# Patient Record
Sex: MALE | URBAN - METROPOLITAN AREA
[De-identification: names, ages, dates, MRNs, and addresses within clinical notes are randomized per-mention and may not be internally consistent; named-entity substitution may affect disease eponyms.]

---

## 2024-06-18 ENCOUNTER — APPOINTMENT (OUTPATIENT)
Dept: URGENT CARE | Facility: CLINIC | Age: 49
End: 2024-06-18

## 2024-07-01 ENCOUNTER — OFFICE VISIT (OUTPATIENT)
Dept: URGENT CARE | Facility: CLINIC | Age: 49
End: 2024-07-01
Payer: COMMERCIAL

## 2024-07-01 VITALS
RESPIRATION RATE: 14 BRPM | SYSTOLIC BLOOD PRESSURE: 142 MMHG | OXYGEN SATURATION: 97 % | DIASTOLIC BLOOD PRESSURE: 94 MMHG | WEIGHT: 199.6 LBS | HEART RATE: 87 BPM | HEIGHT: 70 IN | BODY MASS INDEX: 28.58 KG/M2 | TEMPERATURE: 97.6 F

## 2024-07-01 DIAGNOSIS — G51.0 BELL'S PALSY: Primary | ICD-10-CM

## 2024-07-01 PROCEDURE — 99212 OFFICE O/P EST SF 10 MIN: CPT | Performed by: PHYSICIAN ASSISTANT

## 2024-07-01 RX ORDER — PREDNISONE 10 MG/1
TABLET ORAL DAILY
Qty: 18 TABLET | Refills: 0 | Status: SHIPPED | OUTPATIENT
Start: 2024-07-01 | End: 2024-07-10

## 2024-07-01 RX ORDER — PREDNISONE 50 MG/1
50 TABLET ORAL DAILY
Qty: 7 TABLET | Refills: 0 | Status: SHIPPED | OUTPATIENT
Start: 2024-07-01 | End: 2024-07-08

## 2024-07-01 RX ORDER — VALACYCLOVIR HYDROCHLORIDE 1 G/1
1000 TABLET, FILM COATED ORAL 3 TIMES DAILY
Qty: 21 TABLET | Refills: 0 | Status: SHIPPED | OUTPATIENT
Start: 2024-07-01 | End: 2024-07-08

## 2024-07-01 NOTE — PROGRESS NOTES
[facility] URGENT CARE JANETH NOTE:      Name: Juan Monroy, 48 y.o.    CSN:[id number]   MRN:[id number]    PCP: No primary care provider on file.    ALL:    Allergies   Allergen Reactions    Penicillins Other       History:    Chief Complaint: Facial Droop (RIGHT SIDE FACIAL DROOPING X 3 DAYS)    Encounter Date: 7/1/2024  1115    HPI: The history was obtained from the patient. Juan is a 48 y.o. male, who presents with a chief complaint of Facial Droop (RIGHT SIDE FACIAL DROOPING X 3 DAYS) Patient reports right sided facial droop and numbness of the tongue on Saturday morning. He says his symptoms have not changed since onset. He is having some difficulty eating and drinking due to numbness in the mouth and weakness in the facial muscles. He was admitted to the ER only for severe dehydration 2 weeks ago. He denies head trauma, use of blood thinners, recent illness.     He denies weakness of the extremities, speech difficulty, gait impairment, impaired balance, vision changes, headache, tinnitus, hearing loss, dizziness and paresthesias of the extremities. He takes only ashwaganda and a multivitamin.     Denies history of CV disease.     PMHx:    History reviewed. No pertinent past medical history.           Current Outpatient Medications   Medication Sig Dispense Refill    predniSONE (Deltasone) 10 mg tablet Take 3 tablets (30 mg) by mouth once daily for 3 days, THEN 2 tablets (20 mg) once daily for 3 days, THEN 1 tablet (10 mg) once daily for 3 days. 18 tablet 0    predniSONE (Deltasone) 50 mg tablet Take 1 tablet (50 mg) by mouth once daily for 7 days. FOR THE FIRST SEVEN DAYS 7 tablet 0    valACYclovir (Valtrex) 1 gram tablet Take 1 tablet (1,000 mg) by mouth 3 times a day for 7 days. 21 tablet 0     No current facility-administered medications for this visit.         PMSx:  History reviewed. No pertinent surgical history.    Fam Hx: No family history on file.    SOC. Hx:     Social  History     Socioeconomic History    Marital status:      Spouse name: Not on file    Number of children: Not on file    Years of education: Not on file    Highest education level: Not on file   Occupational History    Not on file   Tobacco Use    Smoking status: Every Day     Types: Cigarettes    Smokeless tobacco: Never   Substance and Sexual Activity    Alcohol use: Not on file    Drug use: Not on file    Sexual activity: Not on file   Other Topics Concern    Not on file   Social History Narrative    Not on file     Social Determinants of Health     Financial Resource Strain: Not on file   Food Insecurity: Not on file   Transportation Needs: Not on file   Physical Activity: Not on file   Stress: Not on file   Social Connections: Not on file   Intimate Partner Violence: Not on file   Housing Stability: Not on file         Vitals:    07/01/24 1100   BP: (!) 142/94   Pulse: 87   Resp: 14   Temp: 36.4 °C (97.6 °F)   SpO2: 97%     90.5 kg (199 lb 9.6 oz)          Physical Exam  Constitutional:       Appearance: Normal appearance.   HENT:      Head: Normocephalic and atraumatic.      Right Ear: Tympanic membrane, ear canal and external ear normal.      Left Ear: Tympanic membrane, ear canal and external ear normal.      Nose: Nose normal.      Mouth/Throat:      Mouth: Mucous membranes are moist.      Tongue: Tongue does not deviate from midline.      Pharynx: Oropharynx is clear. Uvula midline.      Comments: Symmetric rise of the pharynx and uvula.   Eyes:      General: No visual field deficit.     Extraocular Movements: Extraocular movements intact.      Conjunctiva/sclera: Conjunctivae normal.      Pupils: Pupils are equal, round, and reactive to light.   Cardiovascular:      Rate and Rhythm: Normal rate.   Pulmonary:      Effort: Pulmonary effort is normal.   Musculoskeletal:         General: Normal range of motion.   Skin:     General: Skin is warm and dry.   Neurological:      General: No focal deficit  present.      Mental Status: He is alert and oriented to person, place, and time.      Cranial Nerves: Facial asymmetry (right) present.      Sensory: Sensation is intact.      Motor: Motor function is intact. No weakness or pronator drift.      Coordination: Coordination is intact. Romberg sign negative. Finger-Nose-Finger Test normal. Rapid alternating movements normal.      Gait: Gait is intact.      Comments: Right sided facial drooping affecting the forehead/right eyebrow.    Psychiatric:         Mood and Affect: Mood normal.         Behavior: Behavior normal.         Thought Content: Thought content normal.         Judgment: Judgment normal.             ____________________________________________________________________    I did personally review Juan's past medical history, surgical history, social history, as well as family history (when relevant).  In this case, I also oversaw the his drug management by reviewing his medication list, allergy list, as well as the medications that I prescribed during the UC course and/or recommended as an out-patient (including possible OTC medications such as acetaminophen, NSAIDs , etc).    After reviewing the items above, I did look at previous medical documentation, such as recent hospitalizations, office visits, and/or recent consultations with PCP/specialist.                          SDOH:   Another factor that I considered in Juan's care was his Social Determinants of Health (SDOH). During this UC encounter, he did not have social determinants of health. Those SDOH influencing Juan's care are: none      UC COURSE/MEDICAL DECISION MAKING:    Juan is a 48 y.o., who presents with a working diagnosis of   1. Bell's palsy     with a differential to include: TIA, CVA, Herpes zoster, [place] hunt syndrome, HSV, Lyme disease, Guillain barre syndrome, multiple sclerosis, tumor, mastoiditis, otitis media, cholesteatoma    TIA and stroke were considered but less likely to  present as isolated facial droop without sensory or motor disturbances of the extremities, gait impairment and progressive symptoms. Exam of bilateral ears was normal without erythema, TM bulging, or masses. He denied rashes, hearing impairment, headache, vision changes that would cause concern for alternative differentials.    A 3 day history of unchanging right sided facial droop that does not spare the forehead is suggestive of Belly's palsy. He will start on valacyclovir and prednisone burst for 7 days. Additional predisone was sent if taper is needed. He was instructed to start the taper if symptoms do not improve or if he needs a transition from the higher dose. He was provided information on both medications, bells palsy and exercises to strength facial muscles. He should follow up in 2 weeks with PCP or here. He was instructed to go to the ER if he develops weakness of the arms or legs, slurred speech, gait impairment or painful rash.     Note initiated by:  AYDEE Snow-Student, [facility]     Supervised by  Pedro Cardona PA-C   Advanced Practice Provider  [facility] URGENT CARE    I was present with the PA student who participated in the documentation of this note. I have personally seen and re-examined the patient and performed the medical decision-making components (assessment and plan of care). I have reviewed the PA student documentation and verified the findings in the note as written with additions or exceptions as stated in the body of this note.    Pedro Cardona PA-C